# Patient Record
Sex: FEMALE | Race: WHITE | Employment: OTHER | URBAN - METROPOLITAN AREA
[De-identification: names, ages, dates, MRNs, and addresses within clinical notes are randomized per-mention and may not be internally consistent; named-entity substitution may affect disease eponyms.]

---

## 2018-04-18 ENCOUNTER — OFFICE VISIT (OUTPATIENT)
Dept: ORTHOPEDIC SURGERY | Age: 33
End: 2018-04-18

## 2018-04-18 VITALS
HEART RATE: 97 BPM | WEIGHT: 225 LBS | DIASTOLIC BLOOD PRESSURE: 79 MMHG | BODY MASS INDEX: 39.87 KG/M2 | SYSTOLIC BLOOD PRESSURE: 105 MMHG | HEIGHT: 63 IN

## 2018-04-18 DIAGNOSIS — M79.2 RADICULAR PAIN IN RIGHT ARM: ICD-10-CM

## 2018-04-18 DIAGNOSIS — M25.511 RIGHT SHOULDER PAIN, UNSPECIFIED CHRONICITY: Primary | ICD-10-CM

## 2018-04-18 PROCEDURE — 99203 OFFICE O/P NEW LOW 30 MIN: CPT | Performed by: ORTHOPAEDIC SURGERY

## 2018-04-18 PROCEDURE — 20610 DRAIN/INJ JOINT/BURSA W/O US: CPT | Performed by: ORTHOPAEDIC SURGERY

## 2019-06-19 ENCOUNTER — OFFICE VISIT (OUTPATIENT)
Dept: ORTHOPEDIC SURGERY | Age: 34
End: 2019-06-19
Payer: COMMERCIAL

## 2019-06-19 VITALS
BODY MASS INDEX: 41.41 KG/M2 | HEIGHT: 62 IN | WEIGHT: 225 LBS | SYSTOLIC BLOOD PRESSURE: 130 MMHG | HEART RATE: 105 BPM | DIASTOLIC BLOOD PRESSURE: 89 MMHG

## 2019-06-19 DIAGNOSIS — M75.81 RIGHT ROTATOR CUFF TENDINITIS: ICD-10-CM

## 2019-06-19 DIAGNOSIS — M19.011 ARTHRITIS OF RIGHT ACROMIOCLAVICULAR JOINT: ICD-10-CM

## 2019-06-19 DIAGNOSIS — M25.511 RIGHT SHOULDER PAIN, UNSPECIFIED CHRONICITY: Primary | ICD-10-CM

## 2019-06-19 PROCEDURE — 20610 DRAIN/INJ JOINT/BURSA W/O US: CPT | Performed by: ORTHOPAEDIC SURGERY

## 2019-06-19 PROCEDURE — 99213 OFFICE O/P EST LOW 20 MIN: CPT | Performed by: ORTHOPAEDIC SURGERY

## 2019-06-19 RX ORDER — CITALOPRAM 20 MG/1
40 TABLET ORAL NIGHTLY
COMMUNITY

## 2019-06-19 NOTE — PROGRESS NOTES
Patient was seen for right shoulder pain a little over a year ago. 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 She is points to the superior anterior aspect of the shoulder. There is been no intervening trauma. ROS: Pertinent items are noted in HPI. No notes on file    History reviewed. No pertinent past medical history. History reviewed. No pertinent surgical history. History reviewed. No pertinent family history.       Social History    Socioeconomic History      Marital status:       Spouse name: None      Number of children: None      Years of education: None      Highest education level: None    Occupational History      None    Social Needs      Financial resource strain: None      Food insecurity:        Worry: None        Inability: None      Transportation needs:        Medical: None        Non-medical: None    Tobacco Use      Smoking status: Former Smoker        Quit date: 5/7/2016        Years since quitting: 3.1      Smokeless tobacco: Never Used    Substance and Sexual Activity      Alcohol use: None      Drug use: None      Sexual activity: None    Lifestyle      Physical activity:        Days per week: None        Minutes per session: None      Stress: None    Relationships      Social connections:        Talks on phone: None        Gets together: None        Attends Congregational service: None        Active member of club or organization: None        Attends meetings of clubs or organizations: None        Relationship status: None      Intimate partner violence:        Fear of current or ex partner: None        Emotionally abused: None        Physically abused: None        Forced sexual activity: None Other Topics      Concerns:        None    Social History Narrative      None      Current Outpatient Medications:  citalopram (CELEXA) 20 MG tablet, Take 40 mg by mouth, Disp: , Rfl:   omeprazole (PRILOSEC) 10 MG capsule, Take 10 mg by mouth daily, Disp: , Rfl:     No current facility-administered medications for this visit. -- Pcn [Penicillins]     VITAL SIGNS:  /89   Pulse 105   Ht 5' 2\" (1.575 m)   Wt 225 lb (102.1 kg)   BMI 41.15 kg/m²   On examination today she has only about 80 degrees of active flexion only about 65 degrees active abduction. Passively she pretty much has full range of motion all this does cause some superior shoulder pain. She is tender at the [facility] joint is positive crossarm test.  Is very tender in the anterior cuff and is very positive impingement signs. There is no obvious supraspinatus or infraspinatus atrophy. Even though it hurts some she has good resisted external rotation. She has negative belly press test.  She has a trace sulcus on both sides. She has normal elbow wrist and hand range of motion sensibility and strength. 3 views of the right shoulder were obtained today. This shows really no large osteophyte at the [facility] joint but probably L with narrowing of the interspace she has a well centered humeral head no obvious osteophytes no interosseous lesions etc.    Impression recurrent AC arthrosis and impingement. [place] she is not sleeping but the past several days she would like an injection so after sterile prep we injected right shoulder with 80 mg Depo-Medrol 15 mg ropivacaine split between [facility] joint and subacromial space. Patient tolerated this procedure well. She will be sent for an MRI to rule out rotator cuff tear as this will significantly affect her rehab time. She we expect she will probably proceed with a subacromial decompression and distal clavicle excision.

## 2019-06-19 NOTE — PROGRESS NOTES
Depo Medrol     NDC #: [id number]  Lot #:  [id number]  Exp Date: 02/2021    Ropivacaine    [facility]#:  [id number]  LOT#:  [id number]  Exp Date:  11/2022    Rt Shoulder

## 2019-06-21 ENCOUNTER — TELEPHONE (OUTPATIENT)
Dept: ORTHOPEDIC SURGERY | Age: 34
End: 2019-06-21

## 2019-06-24 ENCOUNTER — TELEPHONE (OUTPATIENT)
Dept: ORTHOPEDIC SURGERY | Age: 34
End: 2019-06-24

## 2019-06-25 ENCOUNTER — TELEPHONE (OUTPATIENT)
Dept: ORTHOPEDIC SURGERY | Age: 34
End: 2019-06-25

## 2019-06-25 NOTE — TELEPHONE ENCOUNTER
Scan is pending approval for mri left shoulder. Radha de la cruz need additional info  Contact [address] # [id number]  Use id # as reference [id number]. Please call [employer] before noon tomorrow.

## 2019-07-17 ENCOUNTER — OFFICE VISIT (OUTPATIENT)
Dept: ORTHOPEDIC SURGERY | Age: 34
End: 2019-07-17
Payer: COMMERCIAL

## 2019-07-17 VITALS
DIASTOLIC BLOOD PRESSURE: 82 MMHG | SYSTOLIC BLOOD PRESSURE: 122 MMHG | BODY MASS INDEX: 41.41 KG/M2 | HEART RATE: 106 BPM | WEIGHT: 225 LBS | HEIGHT: 62 IN

## 2019-07-17 DIAGNOSIS — M75.81 RIGHT ROTATOR CUFF TENDINITIS: ICD-10-CM

## 2019-07-17 DIAGNOSIS — M19.011 ARTHRITIS OF RIGHT ACROMIOCLAVICULAR JOINT: Primary | ICD-10-CM

## 2019-07-17 PROCEDURE — 99213 OFFICE O/P EST LOW 20 MIN: CPT | Performed by: ORTHOPAEDIC SURGERY

## 2019-07-24 ENCOUNTER — TELEPHONE (OUTPATIENT)
Dept: ORTHOPEDIC SURGERY | Age: 34
End: 2019-07-24

## 2019-08-14 ENCOUNTER — OFFICE VISIT (OUTPATIENT)
Dept: ORTHOPEDIC SURGERY | Age: 34
End: 2019-08-14

## 2019-08-14 VITALS
SYSTOLIC BLOOD PRESSURE: 117 MMHG | DIASTOLIC BLOOD PRESSURE: 78 MMHG | HEART RATE: 85 BPM | WEIGHT: 225 LBS | BODY MASS INDEX: 41.41 KG/M2 | HEIGHT: 62 IN

## 2019-08-14 DIAGNOSIS — M19.011 ARTHRITIS OF RIGHT ACROMIOCLAVICULAR JOINT: Primary | ICD-10-CM

## 2019-08-14 DIAGNOSIS — M75.81 RIGHT ROTATOR CUFF TENDINITIS: ICD-10-CM

## 2019-08-14 PROCEDURE — 99024 POSTOP FOLLOW-UP VISIT: CPT | Performed by: ORTHOPAEDIC SURGERY

## 2019-08-14 RX ORDER — HYDROCODONE BITATRATE AND ACETAMINOPHEN 5; 325 MG/1; MG/1
1-2 TABLET ORAL EVERY 4 HOURS PRN
Qty: 40 TABLET | Refills: 0 | Status: SHIPPED | OUTPATIENT
Start: 2019-08-14 | End: 2019-08-21

## 2019-08-14 NOTE — PROGRESS NOTES
omeprazole (PRILOSEC) 10 MG capsule, Take 10 mg by mouth nightly , Disp: , Rfl:     No current facility-administered medications for this visit. -- Pcn [Penicillins] -- Anaphylaxis    VITAL SIGNS:  /78   Pulse 85   Ht 5' 2\" (1.575 m)   Wt 225 lb (102.1 kg)   LMP 07/30/2019   BMI 41.15 kg/m²   Ryan the risks, benefits, and alternatives of surgery, including but not limited to infection, stiffness, DVT, neurovascular damage, and others. Patient seems understand accept these risks. All questions were answered. We have given her a prescription for Norco.  We will see her tomorrow for surgery and the following day she will start outpatient physical therapy.

## 2019-08-15 ENCOUNTER — ANESTHESIA (OUTPATIENT)
Dept: OPERATING ROOM | Age: 34
End: 2019-08-15
Payer: COMMERCIAL

## 2019-08-15 ENCOUNTER — ANESTHESIA EVENT (OUTPATIENT)
Dept: OPERATING ROOM | Age: 34
End: 2019-08-15
Payer: COMMERCIAL

## 2019-08-15 ENCOUNTER — HOSPITAL ENCOUNTER (OUTPATIENT)
Age: 34
Setting detail: OUTPATIENT SURGERY
Discharge: HOME | End: 2019-08-15
Attending: ORTHOPAEDIC SURGERY | Admitting: ORTHOPAEDIC SURGERY
Payer: COMMERCIAL

## 2019-08-15 VITALS
OXYGEN SATURATION: 83 % | SYSTOLIC BLOOD PRESSURE: 128 MMHG | DIASTOLIC BLOOD PRESSURE: 92 MMHG | RESPIRATION RATE: 6 BRPM

## 2019-08-15 VITALS
BODY MASS INDEX: 41.52 KG/M2 | DIASTOLIC BLOOD PRESSURE: 75 MMHG | OXYGEN SATURATION: 95 % | RESPIRATION RATE: 12 BRPM | HEIGHT: 63 IN | TEMPERATURE: 97.4 F | WEIGHT: 234.31 LBS | HEART RATE: 94 BPM | SYSTOLIC BLOOD PRESSURE: 102 MMHG

## 2019-08-15 PROBLEM — M75.51 SUBACROMIAL BURSITIS OF RIGHT SHOULDER JOINT: Status: ACTIVE | Noted: 2019-08-15

## 2019-08-15 PROBLEM — M19.011 ARTHROSIS OF RIGHT ACROMIOCLAVICULAR JOINT: Status: ACTIVE | Noted: 2019-08-15

## 2019-08-15 LAB — HCG(URINE) PREGNANCY TEST: NEGATIVE

## 2019-08-15 PROCEDURE — 3600000014 HC SURGERY LEVEL 4 ADDTL 15MIN: Performed by: ORTHOPAEDIC SURGERY

## 2019-08-15 PROCEDURE — 7100000001 HC PACU RECOVERY - ADDTL 15 MIN: Performed by: ORTHOPAEDIC SURGERY

## 2019-08-15 PROCEDURE — 2580000003 HC RX 258: Performed by: ORTHOPAEDIC SURGERY

## 2019-08-15 PROCEDURE — 3700000000 HC ANESTHESIA ATTENDED CARE: Performed by: ORTHOPAEDIC SURGERY

## 2019-08-15 PROCEDURE — 2500000003 HC RX 250 WO HCPCS: Performed by: NURSE ANESTHETIST, CERTIFIED REGISTERED

## 2019-08-15 PROCEDURE — 3700000001 HC ADD 15 MINUTES (ANESTHESIA): Performed by: ORTHOPAEDIC SURGERY

## 2019-08-15 PROCEDURE — 7100000011 HC PHASE II RECOVERY - ADDTL 15 MIN: Performed by: ORTHOPAEDIC SURGERY

## 2019-08-15 PROCEDURE — 7100000000 HC PACU RECOVERY - FIRST 15 MIN: Performed by: ORTHOPAEDIC SURGERY

## 2019-08-15 PROCEDURE — 3600000004 HC SURGERY LEVEL 4 BASE: Performed by: ORTHOPAEDIC SURGERY

## 2019-08-15 PROCEDURE — 6360000002 HC RX W HCPCS: Performed by: ORTHOPAEDIC SURGERY

## 2019-08-15 PROCEDURE — 6360000002 HC RX W HCPCS: Performed by: ANESTHESIOLOGY

## 2019-08-15 PROCEDURE — 84703 CHORIONIC GONADOTROPIN ASSAY: CPT

## 2019-08-15 PROCEDURE — 2709999900 HC NON-CHARGEABLE SUPPLY: Performed by: ORTHOPAEDIC SURGERY

## 2019-08-15 PROCEDURE — 2720000010 HC SURG SUPPLY STERILE: Performed by: ORTHOPAEDIC SURGERY

## 2019-08-15 PROCEDURE — 64415 NJX AA&/STRD BRCH PLXS IMG: CPT | Performed by: ANESTHESIOLOGY

## 2019-08-15 PROCEDURE — 6360000002 HC RX W HCPCS: Performed by: NURSE ANESTHETIST, CERTIFIED REGISTERED

## 2019-08-15 PROCEDURE — 2500000003 HC RX 250 WO HCPCS: Performed by: ORTHOPAEDIC SURGERY

## 2019-08-15 PROCEDURE — 7100000010 HC PHASE II RECOVERY - FIRST 15 MIN: Performed by: ORTHOPAEDIC SURGERY

## 2019-08-15 RX ORDER — LABETALOL HYDROCHLORIDE 5 MG/ML
5 INJECTION, SOLUTION INTRAVENOUS EVERY 10 MIN PRN
Status: DISCONTINUED | OUTPATIENT
Start: 2019-08-15 | End: 2019-08-15 | Stop reason: HOSPADM

## 2019-08-15 RX ORDER — PROMETHAZINE HYDROCHLORIDE 25 MG/ML
6.25 INJECTION, SOLUTION INTRAMUSCULAR; INTRAVENOUS
Status: DISCONTINUED | OUTPATIENT
Start: 2019-08-15 | End: 2019-08-15 | Stop reason: HOSPADM

## 2019-08-15 RX ORDER — LIDOCAINE HYDROCHLORIDE 20 MG/ML
INJECTION, SOLUTION EPIDURAL; INFILTRATION; INTRACAUDAL; PERINEURAL PRN
Status: DISCONTINUED | OUTPATIENT
Start: 2019-08-15 | End: 2019-08-15 | Stop reason: SDUPTHER

## 2019-08-15 RX ORDER — MIDAZOLAM HYDROCHLORIDE 1 MG/ML
2 INJECTION INTRAMUSCULAR; INTRAVENOUS ONCE
Status: COMPLETED | OUTPATIENT
Start: 2019-08-15 | End: 2019-08-15

## 2019-08-15 RX ORDER — FENTANYL CITRATE 50 UG/ML
25 INJECTION, SOLUTION INTRAMUSCULAR; INTRAVENOUS EVERY 5 MIN PRN
Status: DISCONTINUED | OUTPATIENT
Start: 2019-08-15 | End: 2019-08-15 | Stop reason: HOSPADM

## 2019-08-15 RX ORDER — GLYCOPYRROLATE 1 MG/5 ML
SYRINGE (ML) INTRAVENOUS PRN
Status: DISCONTINUED | OUTPATIENT
Start: 2019-08-15 | End: 2019-08-15 | Stop reason: SDUPTHER

## 2019-08-15 RX ORDER — ROPIVACAINE HYDROCHLORIDE 5 MG/ML
INJECTION, SOLUTION EPIDURAL; INFILTRATION; PERINEURAL
Status: COMPLETED | OUTPATIENT
Start: 2019-08-15 | End: 2019-08-15

## 2019-08-15 RX ORDER — CLINDAMYCIN PHOSPHATE 900 MG/50ML
900 INJECTION INTRAVENOUS
Status: COMPLETED | OUTPATIENT
Start: 2019-08-15 | End: 2019-08-15

## 2019-08-15 RX ORDER — LIDOCAINE HYDROCHLORIDE 10 MG/ML
0.5 INJECTION, SOLUTION EPIDURAL; INFILTRATION; INTRACAUDAL; PERINEURAL ONCE
Status: DISCONTINUED | OUTPATIENT
Start: 2019-08-15 | End: 2019-08-15 | Stop reason: HOSPADM

## 2019-08-15 RX ORDER — MAGNESIUM HYDROXIDE 1200 MG/15ML
LIQUID ORAL CONTINUOUS PRN
Status: DISCONTINUED | OUTPATIENT
Start: 2019-08-15 | End: 2019-08-15 | Stop reason: ALTCHOICE

## 2019-08-15 RX ORDER — DEXAMETHASONE SODIUM PHOSPHATE 4 MG/ML
INJECTION, SOLUTION INTRA-ARTICULAR; INTRALESIONAL; INTRAMUSCULAR; INTRAVENOUS; SOFT TISSUE PRN
Status: DISCONTINUED | OUTPATIENT
Start: 2019-08-15 | End: 2019-08-15 | Stop reason: SDUPTHER

## 2019-08-15 RX ORDER — ROCURONIUM BROMIDE 10 MG/ML
INJECTION, SOLUTION INTRAVENOUS PRN
Status: DISCONTINUED | OUTPATIENT
Start: 2019-08-15 | End: 2019-08-15 | Stop reason: SDUPTHER

## 2019-08-15 RX ORDER — SODIUM CHLORIDE, SODIUM LACTATE, POTASSIUM CHLORIDE, CALCIUM CHLORIDE 600; 310; 30; 20 MG/100ML; MG/100ML; MG/100ML; MG/100ML
INJECTION, SOLUTION INTRAVENOUS CONTINUOUS
Status: DISCONTINUED | OUTPATIENT
Start: 2019-08-15 | End: 2019-08-15 | Stop reason: HOSPADM

## 2019-08-15 RX ORDER — HYDROMORPHONE HCL 110MG/55ML
0.5 PATIENT CONTROLLED ANALGESIA SYRINGE INTRAVENOUS EVERY 5 MIN PRN
Status: DISCONTINUED | OUTPATIENT
Start: 2019-08-15 | End: 2019-08-15 | Stop reason: HOSPADM

## 2019-08-15 RX ORDER — ONDANSETRON 2 MG/ML
INJECTION INTRAMUSCULAR; INTRAVENOUS PRN
Status: DISCONTINUED | OUTPATIENT
Start: 2019-08-15 | End: 2019-08-15 | Stop reason: SDUPTHER

## 2019-08-15 RX ORDER — SUCCINYLCHOLINE/SOD CL,ISO/PF 200MG/10ML
SYRINGE (ML) INTRAVENOUS PRN
Status: DISCONTINUED | OUTPATIENT
Start: 2019-08-15 | End: 2019-08-15 | Stop reason: SDUPTHER

## 2019-08-15 RX ORDER — FENTANYL CITRATE 50 UG/ML
INJECTION, SOLUTION INTRAMUSCULAR; INTRAVENOUS PRN
Status: DISCONTINUED | OUTPATIENT
Start: 2019-08-15 | End: 2019-08-15 | Stop reason: SDUPTHER

## 2019-08-15 RX ORDER — PROPOFOL 10 MG/ML
INJECTION, EMULSION INTRAVENOUS PRN
Status: DISCONTINUED | OUTPATIENT
Start: 2019-08-15 | End: 2019-08-15 | Stop reason: SDUPTHER

## 2019-08-15 RX ORDER — NEOSTIGMINE METHYLSULFATE 5 MG/5 ML
SYRINGE (ML) INTRAVENOUS PRN
Status: DISCONTINUED | OUTPATIENT
Start: 2019-08-15 | End: 2019-08-15 | Stop reason: SDUPTHER

## 2019-08-15 RX ADMIN — FENTANYL CITRATE 50 MCG: 50 INJECTION, SOLUTION INTRAMUSCULAR; INTRAVENOUS at 09:11

## 2019-08-15 RX ADMIN — Medication 0.3 MG: at 07:42

## 2019-08-15 RX ADMIN — MIDAZOLAM HYDROCHLORIDE 2 MG: 1 INJECTION, SOLUTION INTRAMUSCULAR; INTRAVENOUS at 06:51

## 2019-08-15 RX ADMIN — Medication 0.25 MG: at 09:07

## 2019-08-15 RX ADMIN — Medication 3 MG: at 09:07

## 2019-08-15 RX ADMIN — PHENYLEPHRINE HYDROCHLORIDE 100 MCG: 10 INJECTION INTRAVENOUS at 08:47

## 2019-08-15 RX ADMIN — PHENYLEPHRINE HYDROCHLORIDE 200 MCG: 10 INJECTION INTRAVENOUS at 08:04

## 2019-08-15 RX ADMIN — FENTANYL CITRATE 50 MCG: 50 INJECTION, SOLUTION INTRAMUSCULAR; INTRAVENOUS at 07:42

## 2019-08-15 RX ADMIN — LIDOCAINE HYDROCHLORIDE 80 MG: 20 INJECTION, SOLUTION EPIDURAL; INFILTRATION; INTRACAUDAL; PERINEURAL at 07:43

## 2019-08-15 RX ADMIN — ROCURONIUM BROMIDE 30 MG: 10 INJECTION, SOLUTION INTRAVENOUS at 08:00

## 2019-08-15 RX ADMIN — FENTANYL CITRATE 50 MCG: 50 INJECTION INTRAMUSCULAR; INTRAVENOUS at 06:51

## 2019-08-15 RX ADMIN — Medication 160 MG: at 07:44

## 2019-08-15 RX ADMIN — SODIUM CHLORIDE, POTASSIUM CHLORIDE, SODIUM LACTATE AND CALCIUM CHLORIDE: 600; 310; 30; 20 INJECTION, SOLUTION INTRAVENOUS at 08:47

## 2019-08-15 RX ADMIN — DEXAMETHASONE SODIUM PHOSPHATE 8 MG: 4 INJECTION, SOLUTION INTRAMUSCULAR; INTRAVENOUS at 08:15

## 2019-08-15 RX ADMIN — CLINDAMYCIN PHOSPHATE 900 MG: 900 INJECTION, SOLUTION INTRAVENOUS at 07:23

## 2019-08-15 RX ADMIN — ONDANSETRON 4 MG: 2 INJECTION INTRAMUSCULAR; INTRAVENOUS at 08:15

## 2019-08-15 RX ADMIN — PROPOFOL 160 MG: 10 INJECTION, EMULSION INTRAVENOUS at 07:43

## 2019-08-15 RX ADMIN — ROPIVACAINE HYDROCHLORIDE 30 ML: 5 INJECTION, SOLUTION EPIDURAL; INFILTRATION; PERINEURAL at 07:05

## 2019-08-15 RX ADMIN — SODIUM CHLORIDE, POTASSIUM CHLORIDE, SODIUM LACTATE AND CALCIUM CHLORIDE: 600; 310; 30; 20 INJECTION, SOLUTION INTRAVENOUS at 07:23

## 2019-08-15 ASSESSMENT — PULMONARY FUNCTION TESTS
PIF_VALUE: 19
PIF_VALUE: 5
PIF_VALUE: 21
PIF_VALUE: 3
PIF_VALUE: 22
PIF_VALUE: 20
PIF_VALUE: 29
PIF_VALUE: 25
PIF_VALUE: 22
PIF_VALUE: 22
PIF_VALUE: 19
PIF_VALUE: 22
PIF_VALUE: 3
PIF_VALUE: 3
PIF_VALUE: 22
PIF_VALUE: 2
PIF_VALUE: 21
PIF_VALUE: 18
PIF_VALUE: 21
PIF_VALUE: 22
PIF_VALUE: 25
PIF_VALUE: 22
PIF_VALUE: 22
PIF_VALUE: 5
PIF_VALUE: 2
PIF_VALUE: 22
PIF_VALUE: 2
PIF_VALUE: 22
PIF_VALUE: 22
PIF_VALUE: 21
PIF_VALUE: 1
PIF_VALUE: 5
PIF_VALUE: 2
PIF_VALUE: 21
PIF_VALUE: 20
PIF_VALUE: 22
PIF_VALUE: 2
PIF_VALUE: 19
PIF_VALUE: 19
PIF_VALUE: 21
PIF_VALUE: 22
PIF_VALUE: 8
PIF_VALUE: 6
PIF_VALUE: 22
PIF_VALUE: 19
PIF_VALUE: 22
PIF_VALUE: 19
PIF_VALUE: 7
PIF_VALUE: 22
PIF_VALUE: 19
PIF_VALUE: 0
PIF_VALUE: 3
PIF_VALUE: 2
PIF_VALUE: 22
PIF_VALUE: 6
PIF_VALUE: 19
PIF_VALUE: 8
PIF_VALUE: 22
PIF_VALUE: 22
PIF_VALUE: 2
PIF_VALUE: 20
PIF_VALUE: 19
PIF_VALUE: 22
PIF_VALUE: 21
PIF_VALUE: 2
PIF_VALUE: 22
PIF_VALUE: 19
PIF_VALUE: 16
PIF_VALUE: 21
PIF_VALUE: 22
PIF_VALUE: 19
PIF_VALUE: 18
PIF_VALUE: 22
PIF_VALUE: 19
PIF_VALUE: 21
PIF_VALUE: 22
PIF_VALUE: 22
PIF_VALUE: 21
PIF_VALUE: 22
PIF_VALUE: 22
PIF_VALUE: 19
PIF_VALUE: 22
PIF_VALUE: 19
PIF_VALUE: 8
PIF_VALUE: 22
PIF_VALUE: 19
PIF_VALUE: 20
PIF_VALUE: 22
PIF_VALUE: 22
PIF_VALUE: 30
PIF_VALUE: 22
PIF_VALUE: 19

## 2019-08-15 ASSESSMENT — PAIN DESCRIPTION - DESCRIPTORS: DESCRIPTORS: ACHING

## 2019-08-15 ASSESSMENT — PAIN - FUNCTIONAL ASSESSMENT
PAIN_FUNCTIONAL_ASSESSMENT: PREVENTS OR INTERFERES SOME ACTIVE ACTIVITIES AND ADLS
PAIN_FUNCTIONAL_ASSESSMENT: 0-10

## 2019-08-15 NOTE — PROGRESS NOTES
Block complete. Pt tolerated well.  Post procedure VS= 120/80 P=81  O2 sat remained 97-99% throughout

## 2019-08-15 NOTE — PROGRESS NOTES
Monitors applied. BP= 126/84. P= 86 O2 sat= 98% on 2L via NC. Time out completed per protocol prior to right interscalene block. Versed 2mg and fentanyl 50cg given as ordered by anesthesia.

## 2019-08-15 NOTE — PROCEDURES
There  were no loose bodies in the axillary pouch. The articular cartilage of  glenoid and humerus were normal.  Subacromial space was entered. There  was very thick and friable bursitis _____ through lateral and posterior  portals. A subacromial bursectomy was done. The bursal side of the  cuff was intact. This was a type 3 acromion which had a fairly large  hook which was very tight on the anterior cuff. Subacromial  decompression was performed, flattening the undersurface of the  acromion. The inferior capsule of the [facility] joint was ablated. The distal  clavicle showed it was at least 50% denuded of cartilage. Distal  clavicle excision was completed. Any bleeding points were coagulated. The skin was closed with 4-0 Monocryl and Steri-Strips. Sterile  dressing was applied. The patient was placed in a sling. The patient  tolerated this procedure well. There were no known complications. She  was returned to recovery area in stable condition.         Colten Tyler MD    D: 08/15/2019 9:13:19       T: 08/15/2019 14:24:11     [email or web address]  Job#: [id number]     Doc#: [id number]    CC:

## 2019-08-16 ENCOUNTER — HOSPITAL ENCOUNTER (OUTPATIENT)
Dept: PHYSICAL THERAPY | Age: 34
Setting detail: THERAPIES SERIES
Discharge: HOME | End: 2019-08-16
Payer: COMMERCIAL

## 2019-08-16 PROCEDURE — 97161 PT EVAL LOW COMPLEX 20 MIN: CPT | Performed by: PHYSICAL THERAPIST

## 2019-08-16 PROCEDURE — 97110 THERAPEUTIC EXERCISES: CPT | Performed by: PHYSICAL THERAPIST

## 2019-08-16 NOTE — FLOWSHEET NOTE
surgery              []Dermatomes/Myotomes intact               []Reflexes equal and normal bilaterally              []Other:     Joint mobility: Deferred d/t recent surgery              []Normal               []Hypo              []Hyper     Palpation: generalized TTP around shoulder joint     Functional Mobility/Transfers: Decreased d/t recent surgery being on dominant arm; +use of sling     Posture: WFL     Bandages/Dressings/Incisions:   8/16/19 post op dressings removed in clinic,no present drainage, no s/s of infection, steri strips intact. RESTRICTIONS/PRECAUTIONS:  Flex/ABD not past 90 x2 weeks    Exercises/Interventions:   Exercise/Equipment Resistance/Repetitions Other comments   Stretching/PROM     Wand     Table Slides 10x10\" flex/ER Educated on ABD to begin at home on 8/19   UE [place]     Pulleys     Pendulum          Isometrics     Retraction          Weight shift     Flexion     Abduction     External Rotation     Internal Rotation     Biceps     Triceps          PRE's     Flexion     Abduction     External Rotation     Internal Rotation     Shrugs x30    EXT     Reverse Flys     Serratus     Horizontal Abd with ER     Biceps x30 AROM    Triceps     Retraction x30         Cable Column/Theraband     External Rotation     Internal Rotation     Shrugs     Lats     Ext     Flex     Scapular Retraction     BIC     TRIC     PNF          Dynamic Stability          Plyoback            Plan for next session: progress as tolerated    Patient Education:  8/16/19 Pt educated on diagnosis, prognosis and PT plan of care. Educated on [employer]. Pt questions were addressed and answered.     Therapeutic Exercise and NMR EXR:  [x] (48435) Provided verbal/tactile cueing for activities related to strengthening, flexibility, endurance, ROM  for improvements in scapular, scapulothoracic and UE control with self care, reaching, carrying, lifting, house/yardwork, driving/computer work.    [] (34741) Provided verbal/tactile

## 2019-08-16 NOTE — PLAN OF CARE
limitations in ROM. Also having pain with lifting and OH movements. Carrying anything bothered her shoulder severely. Riding rides at [employer]. Pain was in an L shape around the anterior shoulder, achy all the time. Pt tried exercises on her own, no formal PT, worked on ROM and pulleys. Ice and ibuprofen helped with symptoms. Hobbies: walking dog, hiking  Goals: get feeling better    Relevant Medical History:see intake form  Functional Disability Index: UEFI  8/16/19 score 0/80=0% (100% deficit)    Pain Scale: 8/10  Easing factors: rest, pain medication  Provocative factors: movement     Type: [x]Constant   []Intermittent  []Radiating []Localized []other:     Numbness/Tingling: R thumb    Occupation/School: , plans to go back to work 8/26 if she is feeling up to it    Living Status/Prior Level of Function: Independent with ADLs and IADLs.     OBJECTIVE:   Deferred d/t recent surgery  ROM PROM AROM  Comment    L R L R    Flexion        Abduction        ER        IR          Deferred d/t recent surgery  Strength L R Comment   Flexion      Abduction      ER      IR      Supraspinatus      Upper Trap      Lower Trap      Mid Trap      Rhomboids      Biceps      Triceps      Horizontal Abduction      Horizontal Adduction      Lats        Deferred d/t recent surgery  Special Tests Results/Comment   Blaine Hagan    Painful Arc    OBriens    Speeds    Apley's Scratch    Belly Press    Drop Arm Test    Full Can    Hornblower's    Lift-Off sign  (Subscap lesion)      Reflexes/Sensation: Deferred d/t recent surgery   []Dermatomes/Myotomes intact    []Reflexes equal and normal bilaterally   []Other:    Joint mobility: Deferred d/t recent surgery   []Normal    []Hypo   []Hyper    Palpation: generalized TTP around shoulder joint    Functional Mobility/Transfers: Decreased d/t recent surgery being on dominant arm; +use of sling    Posture: WFL    Bandages/Dressings/Incisions:   8/16/19 post op dressings removed in clinic,no present drainage, no s/s of infection, steri strips intact. [x] Patient history, allergies, meds reviewed. Medical chart reviewed. See intake form. Review Of Systems (ROS):  [x]Performed Review of systems (Integumentary, CardioPulmonary, Neurological) by intake and observation. Intake form has been scanned into medical record. Patient has been instructed to contact their primary care physician regarding ROS issues if not already being addressed at this time.       Co-morbidities/Complexities (which will affect course of rehabilitation):   [x]None           Arthritic conditions   []Rheumatoid arthritis (M05.9)  []Osteoarthritis (M19.91)   Cardiovascular conditions   []Hypertension (I10)  []Hyperlipidemia (E78.5)  []Angina pectoris (I20)  []Atherosclerosis (I70)   Musculoskeletal conditions   []Disc pathology   []Congenital spine pathologies   []Prior surgical intervention  []Osteoporosis (M81.8)  []Osteopenia (M85.8)   Endocrine conditions   []Hypothyroid (E03.9)  []Hyperthyroid Gastrointestinal conditions   []Constipation (B61.27)   Metabolic conditions   []Morbid obesity (E66.01)  []Diabetes type 1(E10.65) or 2 (E11.65)   []Neuropathy (G60.9)     Pulmonary conditions   []Asthma (J45)  []Coughing   []COPD (J44.9)   Psychological Disorders  []Anxiety (F41.9)  []Depression (F32.9)   []Other:   []Other:          Barriers to/and or personal factors that will affect rehab potential:              []Age  []Sex              []Motivation/Lack of Motivation                        []Co-Morbidities              []Cognitive Function, education/learning barriers              []Environmental, home barriers              [x]profession/work barriers  []past PT/medical experience  []other:  Justification: 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 [x] Modalities as needed that may include: thermal agents, E-stim, Biofeedback, US, iontophoresis as indicated  [x] Patient education on joint protection, postural re-education, activity modification, progression of HEP. HEP instruction:   Initiated 8/16/19. Printed hand out given. Pt demonstrated proper form of each exercise and expressed verbal understanding of frequency and duration. GOALS:  Patient stated goal: Return to normal tasks, be able to walk dog, hiking, pain free    Therapist goals for Patient:   Short Term Goals: To be achieved in: 2 weeks 8/30/19  1. Independent in HEP and progression per patient tolerance, in order to prevent re-injury. 2. Patient will have a decrease in pain to facilitate improvement in movement, function, and ADLs as indicated by Functional Deficits. 3. Patient will DC use of sling without increased symptoms (within 1 week). 4. Patient will tolerate sleeping with 0-2/10 pain. (2-4 weeks)    Long Term Goals: To be achieved in: 12 weeks 11/8/19  1. Disability index score of 10% or less for the UEFS to assist with reaching prior level of function. 2. Patient will demonstrate increased AROM to WNL to allow for proper joint functioning as indicated by patients Functional Deficits. 3. Patient will demonstrate an increase in RUE strength to 4+/5 to allow for proper functional mobility as indicated by patients Functional Deficits. 4. Patient will return to ADLs, IADLs and functional activities without increased symptoms or restriction. 5. Patient will tolerate return to walking her dog while holding leash in R arm without increased symptoms or restriction. Electronically signed by:  Alejandrina Jarvis PT, DPT  Physical Therapist  [id number]  [email or web address]

## 2019-08-21 ENCOUNTER — APPOINTMENT (OUTPATIENT)
Dept: PHYSICAL THERAPY | Age: 34
End: 2019-08-21
Payer: COMMERCIAL

## 2019-08-23 ENCOUNTER — OFFICE VISIT (OUTPATIENT)
Dept: ORTHOPEDIC SURGERY | Age: 34
End: 2019-08-23

## 2019-08-23 VITALS
WEIGHT: 227 LBS | BODY MASS INDEX: 41.77 KG/M2 | HEIGHT: 62 IN | SYSTOLIC BLOOD PRESSURE: 111 MMHG | DIASTOLIC BLOOD PRESSURE: 78 MMHG | HEART RATE: 92 BPM

## 2019-08-23 DIAGNOSIS — M75.81 RIGHT ROTATOR CUFF TENDINITIS: ICD-10-CM

## 2019-08-23 DIAGNOSIS — M19.011 ARTHRITIS OF RIGHT ACROMIOCLAVICULAR JOINT: Primary | ICD-10-CM

## 2019-08-23 PROCEDURE — 99024 POSTOP FOLLOW-UP VISIT: CPT | Performed by: ORTHOPAEDIC SURGERY

## 2019-08-23 RX ORDER — TRAMADOL HYDROCHLORIDE 50 MG/1
TABLET ORAL
Refills: 0 | COMMUNITY
Start: 2019-08-18

## 2019-08-28 ENCOUNTER — HOSPITAL ENCOUNTER (OUTPATIENT)
Dept: PHYSICAL THERAPY | Age: 34
Setting detail: THERAPIES SERIES
Discharge: HOME | End: 2019-08-28
Payer: COMMERCIAL

## 2019-08-28 PROCEDURE — 97110 THERAPEUTIC EXERCISES: CPT | Performed by: PHYSICAL THERAPIST

## 2019-08-28 NOTE — FLOWSHEET NOTE
surgery  Special Tests Results/Comment   Blaine Hagan     Painful Arc     OBriens     Speeds     Apley's Scratch     Belly Press     Drop Arm Test     Full Can     Hornblower's     Lift-Off sign  (Subscap lesion)        Reflexes/Sensation: Deferred d/t recent surgery              []Dermatomes/Myotomes intact               []Reflexes equal and normal bilaterally              []Other:     Joint mobility: Deferred d/t recent surgery              []Normal               []Hypo              []Hyper     Palpation: generalized TTP around shoulder joint     Functional Mobility/Transfers: Decreased d/t recent surgery being on dominant arm; +use of sling     Posture: WFL     Bandages/Dressings/Incisions:   8/16/19 post op dressings removed in clinic,no present drainage, no s/s of infection, steri strips intact. 8/28/19 Sutures removed in clinic. Mild bleeding from lateral incision, band aid placed over incision.  Incisions are healing well, no s/s    RESTRICTIONS/PRECAUTIONS:  Flex/ABD not past 90 x2 weeks    Exercises/Interventions:   Exercise/Equipment Resistance/Repetitions Other comments   Stretching/PROM     Wand 3x30\" supine 90/90 ER    Table Slides 3x30\" flex/ABD    UE [place]     Pulleys     Pendulum          Isometrics     Retraction          Weight shift     Flexion     Abduction     External Rotation 10x10\" red    Internal Rotation 10x10\" red    Biceps     Triceps          PRE's     Flexion     Abduction     External Rotation 3x10 0/1/1#    Internal Rotation     Shrugs 3x10 3#    EXT     Reverse Flys     Serratus NPV    Horizontal Abd with ER     Biceps 3x10 3#    Triceps     Retraction x30         Cable Column/Theraband     External Rotation     Internal Rotation     Shrugs     Lats     Ext     Flex     Scapular Retraction 3x10 blue    BIC     TRIC     W ER NPV    Supine HABD NPV         Dynamic Stability          Plyoback            Plan for next session: serratus punches, W ER, supine Plan just implemented, too soon to assess goals progression  [] Other:     ASSESSMENT:  No formal measurements taken this date but pt demonstrates improved flexion ROM, good tolerance to ABD table slide and ER 90/90 stretching. By visual assessment achieved 90 deg ER. Pt tolerated increased hold times of stretches and new strength exercises well. Reported greatest fatigue with ER based exercises. Required initial VCs for form and technique with sidelying ER but then able to complete correctly. Pt requires PT follow up to address ROM, strength and functional mobility deficits. Treatment/Activity Tolerance:  [x] Patient tolerated treatment well [] Patient limited by fatique  [] Patient limited by pain  [] Patient limited by other medical complications  [] Other:     Prognosis: [x] Good [] Fair  [] Poor    Patient Requires Follow-up: [x] Yes  [] No    PLAN: See eval  [x] Continue per plan of care [] Alter current plan (see comments)  [] Plan of care initiated [] Hold pending MD visit [] Discharge    Electronically signed by: Xiomara Ware PT, DPT  Physical Therapist  [id number]  [email or web address]    *Note: If patient does not return for scheduled / recommended follow up visit, this note will serve as their discharge note from physical therapy.

## 2019-08-29 ENCOUNTER — HOSPITAL ENCOUNTER (OUTPATIENT)
Dept: PHYSICAL THERAPY | Age: 34
Setting detail: THERAPIES SERIES
End: 2019-08-29
Payer: COMMERCIAL

## 2025-04-24 ENCOUNTER — OFFICE VISIT (OUTPATIENT)
Dept: URGENT CARE | Age: 40
End: 2025-04-24

## 2025-04-24 VITALS
SYSTOLIC BLOOD PRESSURE: 126 MMHG | BODY MASS INDEX: 41.52 KG/M2 | DIASTOLIC BLOOD PRESSURE: 80 MMHG | HEART RATE: 94 BPM | OXYGEN SATURATION: 97 % | TEMPERATURE: 98.2 F | WEIGHT: 227 LBS

## 2025-04-24 DIAGNOSIS — R05.9 COUGH WITH FEVER: ICD-10-CM

## 2025-04-24 DIAGNOSIS — J10.1 INFLUENZA A: Primary | ICD-10-CM

## 2025-04-24 DIAGNOSIS — R50.9 COUGH WITH FEVER: ICD-10-CM

## 2025-04-24 LAB
INFLUENZA VIRUS A RNA: POSITIVE
INFLUENZA VIRUS B RNA: ABNORMAL
Lab: NORMAL
PERFORMING INSTRUMENT: NORMAL
QC PASS/FAIL: NORMAL
SARS-COV-2, POC: NORMAL

## 2025-04-24 RX ORDER — BENZONATATE 100 MG/1
100-200 CAPSULE ORAL 3 TIMES DAILY PRN
Qty: 42 CAPSULE | Refills: 0 | Status: SHIPPED | OUTPATIENT
Start: 2025-04-24 | End: 2025-05-01

## 2025-04-24 RX ORDER — DEXTROMETHORPHAN HYDROBROMIDE AND PROMETHAZINE HYDROCHLORIDE 15; 6.25 MG/5ML; MG/5ML
5 SYRUP ORAL 4 TIMES DAILY PRN
Qty: 200 ML | Refills: 0 | Status: SHIPPED | OUTPATIENT
Start: 2025-04-24

## 2025-04-24 RX ORDER — OSELTAMIVIR PHOSPHATE 75 MG/1
75 CAPSULE ORAL 2 TIMES DAILY
Qty: 10 CAPSULE | Refills: 0 | Status: SHIPPED | OUTPATIENT
Start: 2025-04-24 | End: 2025-04-29

## 2025-04-24 ASSESSMENT — ENCOUNTER SYMPTOMS
SINUS PAIN: 1
SINUS PRESSURE: 1
COUGH: 1
SORE THROAT: 1
CHEST TIGHTNESS: 0
SHORTNESS OF BREATH: 0

## 2025-04-24 NOTE — PROGRESS NOTES
Olga Jordan (:  1985) is a 39 y.o. female,New patient, here for evaluation of the following chief complaint(s):  Cough (Pt states deep loud cough mix with dry and mucus. It's going on since last night. )      ASSESSMENT/PLAN:    ICD-10-CM    1. Influenza A  J10.1 oseltamivir (TAMIFLU) 75 MG capsule      2. Cough with fever  R05.9 POCT Influenza A/B DNA (Alere i)    R50.9 POCT COVID-19, Antigen     promethazine-dextromethorphan (PROMETHAZINE-DM) 6.25-15 MG/5ML syrup     benzonatate (TESSALON) 100 MG capsule        Results for orders placed or performed in visit on 25   POCT COVID-19, Antigen   Result Value Ref Range    SARS-COV-2, POC Not-Detected Not Detected    Lot Number [id number]     QC Pass/Fail pass     Performing Instrument BinaxNOW    POCT Influenza A/B DNA (Alere i)   Result Value Ref Range    Influenza virus A RNA positive     Influenza virus B RNA neg       Influenza A/Cough with fever  Oseltamivir take as directed.  Drink fluids  Rest  Acetaminophen/ibuprofen for fever/pain  Treat symptoms  Promethazine DM for cough  Benzonatate for cough            Patient verbalized understanding of printed and verbal discharge instructions including follow up care.   Follow up with your primary care provider for persistent symptoms.   Follow up in 7 days if symptoms persist or if symptoms worsen.    SUBJECTIVE/OBJECTIVE:  Quit smoking 10 years ago. Smoked for about 10 years. Sinus congestion.       History provided by:  Patient  Cough  This is a new problem. The current episode started yesterday. The problem has been rapidly worsening. The problem occurs every few minutes. The cough is Non-productive. Associated symptoms include a fever, postnasal drip and a sore throat. Pertinent negatives include no ear pain, myalgias or shortness of breath. She has tried OTC cough suppressant for the symptoms. The treatment provided no relief. There is no history of asthma.         Past Medical History:   Diagnosis

## 2025-04-24 NOTE — PATIENT INSTRUCTIONS
What is the flu?   This is an infection that can cause fever, cough, body aches, and other symptoms. The most common type of flu is the \"seasonal\" flu. There are different forms of seasonal fl?, for example, \"type A\" and \"type B.\" The medical term for the flu is \"infl??nza.\"  All forms of the fl? are caused by viruses. Antibiotics do not work to treat the fl?. Doctors might prescribe you an \"antiviral\" medicine. If so, follow your doctor's instructions. The flu can be dangerous because it can cause a serious lung infection called pneumonia.  How do I care for myself at home?   Ask the doctor or nurse what you should do when you go home. Make sure that you understand exactly what you need to do to care for yourself. Ask questions if there is anything you do not understand.  You should also:  ?Take all of your medicines as instructed, even if you are feeling better.  ?Get lots of rest. Sleep when you feel tired. Avoid doing tiring activities.  ?Take warm, steamy showers to help soothe your cough.  ?Use hard candy or cough drops to soothe a sore throat and cough.  ?Try to thin mucus:  Drink lots of liquids.  Use a cool mist humidifier, if your doctor told you to. Keep the humidifier clean.  Use saline nose drops to relieve stuffiness.  ?Take a medicine like acetaminophen (sample brand name: Tylenol) or ibuprofen (sample brand names: Advil, Motrin) to help bring down your fever.  ?Dress in lightweight clothes if you have a fever. Cover with a light sheet or blanket if needed. This will help keep you from getting too warm.  ?Lower the chance of passing the infection to others:  Stay home while you are feeling sick or have a fever.  At home, try to limit close contact with other people. You can also help protect others by wearing a face mask.  Wash your hands often. Alcohol-based hand sanitizers also work to kill germs.  Cover your mouth and nose with the inside of your elbow when you cough or sneeze.  Avoid touching your

## (undated) DEVICE — DYONICS 25 PATIENT TUBE SET MUST                                    BE USED WITH 7211007, 12 PER BOX

## (undated) DEVICE — GOWN,PREVENTION PLUS,XLN/XL,ST,24/CS: Brand: MEDLINE

## (undated) DEVICE — SHEET,DRAPE,53X77,STERILE: Brand: MEDLINE

## (undated) DEVICE — KIT OR ROOM TURNOVER W/STRAP

## (undated) DEVICE — 3M™ STERI-DRAPE™ U-DRAPE 1067 1067 5/BX 4BX/CS/CTN&#X20;: Brand: STERI-DRAPE™

## (undated) DEVICE — GAUZE,SPONGE,4"X4",8PLY,STRL,LF,10/TRAY: Brand: MEDLINE

## (undated) DEVICE — PACK PROCEDURE SURG SHLDR MFFOP CUST

## (undated) DEVICE — 5.5 MM ELITE ACROMIOBLASTER                                    STRAIGHT DISPOSABLE BURRS, BRICK                                    RED, 10000 MAXIMUM RPM, PACKAGED 6                                    PER BOX, STERILE

## (undated) DEVICE — 3M™ IOBAN™ 2 ANTIMICROBIAL INCISE DRAPE 6650EZ: Brand: IOBAN™ 2

## (undated) DEVICE — DYONICS 25 DAY TUBE SET MUST BE                                    USED WITH 7211008, 3 PER BOX

## (undated) DEVICE — T-MAX DISPOSABLE FACE MASK 8 PER BOX

## (undated) DEVICE — SHOULDER STABILIZATION KIT,                                    DISPOSABLE 12 PER BOX

## (undated) DEVICE — BOWL MED L 32OZ PLAS W/ MOLD GRAD EZ OPN PEEL PCH

## (undated) DEVICE — 3M™ STERI-STRIP™ REINFORCED ADHESIVE SKIN CLOSURES, R1547, 1/2 IN X 4 IN (12 MM X 100 MM), 6 STRIPS/ENVELOPE: Brand: 3M™ STERI-STRIP™

## (undated) DEVICE — CHLORAPREP 26ML ORANGE

## (undated) DEVICE — MAT FLR ABS DISP

## (undated) DEVICE — Z INACTIVE USE 2660664 SOLUTION IRRIG 3000ML 0.9% SOD CHL USP UROMATIC PLAS CONT

## (undated) DEVICE — INCISOR PLUS PLATINUM 4.5 MM BLADE: Brand: DYONICS INCISOR

## (undated) DEVICE — AMBIENT SUPER TURBOVAC 90: Brand: COBLATION

## (undated) DEVICE — 1840 FOAM BLOCK NEEDLE COUNTER: Brand: DEVON

## (undated) DEVICE — GLOVE ORANGE PI 8   MSG9080

## (undated) DEVICE — 6 ML SYRINGE LUER-LOCK TIP: Brand: MONOJECT

## (undated) DEVICE — HYPODERMIC SAFETY NEEDLE: Brand: MAGELLAN

## (undated) DEVICE — MEDI-VAC NON-CONDUCTIVE SUCTION TUBING: Brand: CARDINAL HEALTH

## (undated) DEVICE — SUTURE MCRYL SZ 4-0 L27IN ABSRB UD L19MM PS-2 1/2 CIR PRIM Y426H

## (undated) DEVICE — DRAPE,U/ SHT,SPLIT,PLAS,STERIL: Brand: MEDLINE

## (undated) DEVICE — 1010 S-DRAPE TOWEL DRAPE 10/BX: Brand: STERI-DRAPE™

## (undated) DEVICE — Device